# Patient Record
Sex: MALE | Race: WHITE | NOT HISPANIC OR LATINO | URBAN - METROPOLITAN AREA
[De-identification: names, ages, dates, MRNs, and addresses within clinical notes are randomized per-mention and may not be internally consistent; named-entity substitution may affect disease eponyms.]

---

## 2017-01-09 ENCOUNTER — EMERGENCY (EMERGENCY)
Facility: HOSPITAL | Age: 72
LOS: 1 days | Discharge: HOME | End: 2017-01-09
Attending: EMERGENCY MEDICINE | Admitting: EMERGENCY MEDICINE
Payer: MEDICARE

## 2017-01-09 VITALS
OXYGEN SATURATION: 95 % | SYSTOLIC BLOOD PRESSURE: 123 MMHG | WEIGHT: 184.09 LBS | HEIGHT: 73 IN | RESPIRATION RATE: 18 BRPM | TEMPERATURE: 101 F | HEART RATE: 77 BPM | DIASTOLIC BLOOD PRESSURE: 73 MMHG

## 2017-01-09 VITALS
HEART RATE: 71 BPM | TEMPERATURE: 100 F | DIASTOLIC BLOOD PRESSURE: 71 MMHG | OXYGEN SATURATION: 96 % | SYSTOLIC BLOOD PRESSURE: 139 MMHG | RESPIRATION RATE: 16 BRPM

## 2017-01-09 DIAGNOSIS — Z96.652 PRESENCE OF LEFT ARTIFICIAL KNEE JOINT: Chronic | ICD-10-CM

## 2017-01-09 DIAGNOSIS — R50.9 FEVER, UNSPECIFIED: ICD-10-CM

## 2017-01-09 DIAGNOSIS — R05 COUGH: ICD-10-CM

## 2017-01-09 LAB
ALBUMIN SERPL ELPH-MCNC: 3.9 G/DL — SIGNIFICANT CHANGE UP (ref 3.4–5)
ALP SERPL-CCNC: 53 U/L — SIGNIFICANT CHANGE UP (ref 40–120)
ALT FLD-CCNC: 31 U/L — SIGNIFICANT CHANGE UP (ref 12–42)
ANION GAP SERPL CALC-SCNC: 8 MMOL/L — LOW (ref 9–16)
APPEARANCE UR: CLEAR — SIGNIFICANT CHANGE UP
APTT BLD: 33 SEC — SIGNIFICANT CHANGE UP (ref 27.5–37.4)
AST SERPL-CCNC: 12 U/L — LOW (ref 15–37)
BASOPHILS NFR BLD AUTO: 0.2 % — SIGNIFICANT CHANGE UP (ref 0–2)
BILIRUB SERPL-MCNC: 1.3 MG/DL — HIGH (ref 0.2–1.2)
BILIRUB UR-MCNC: NEGATIVE — SIGNIFICANT CHANGE UP
BUN SERPL-MCNC: 19 MG/DL — SIGNIFICANT CHANGE UP (ref 7–23)
CALCIUM SERPL-MCNC: 8.3 MG/DL — LOW (ref 8.5–10.5)
CHLORIDE SERPL-SCNC: 99 MMOL/L — SIGNIFICANT CHANGE UP (ref 96–108)
CO2 SERPL-SCNC: 27 MMOL/L — SIGNIFICANT CHANGE UP (ref 22–31)
COLOR SPEC: YELLOW — SIGNIFICANT CHANGE UP
CREAT SERPL-MCNC: 1.06 MG/DL — SIGNIFICANT CHANGE UP (ref 0.5–1.3)
DIFF PNL FLD: (no result)
EOSINOPHIL NFR BLD AUTO: 2.3 % — SIGNIFICANT CHANGE UP (ref 0–6)
GLUCOSE SERPL-MCNC: 103 MG/DL — HIGH (ref 70–99)
GLUCOSE UR QL: NEGATIVE — SIGNIFICANT CHANGE UP
HCT VFR BLD CALC: 40.7 % — SIGNIFICANT CHANGE UP (ref 39–50)
HGB BLD-MCNC: 14.1 G/DL — SIGNIFICANT CHANGE UP (ref 13–17)
INR BLD: 1.22 — HIGH (ref 0.88–1.16)
KETONES UR-MCNC: NEGATIVE — SIGNIFICANT CHANGE UP
LACTATE SERPL-SCNC: 1.1 MMOL/L — SIGNIFICANT CHANGE UP (ref 0.5–2)
LEUKOCYTE ESTERASE UR-ACNC: NEGATIVE — SIGNIFICANT CHANGE UP
LYMPHOCYTES # BLD AUTO: 13.5 % — SIGNIFICANT CHANGE UP (ref 13–44)
MCHC RBC-ENTMCNC: 31.8 PG — SIGNIFICANT CHANGE UP (ref 27–34)
MCHC RBC-ENTMCNC: 34.6 G/DL — SIGNIFICANT CHANGE UP (ref 32–36)
MCV RBC AUTO: 91.7 FL — SIGNIFICANT CHANGE UP (ref 80–100)
MONOCYTES NFR BLD AUTO: 9.6 % — SIGNIFICANT CHANGE UP (ref 2–14)
NEUTROPHILS NFR BLD AUTO: 74.4 % — SIGNIFICANT CHANGE UP (ref 43–77)
NITRITE UR-MCNC: NEGATIVE — SIGNIFICANT CHANGE UP
PH UR: 6 — SIGNIFICANT CHANGE UP (ref 4–8)
PLATELET # BLD AUTO: 138 K/UL — LOW (ref 150–400)
POTASSIUM SERPL-MCNC: 3.9 MMOL/L — SIGNIFICANT CHANGE UP (ref 3.5–5.3)
POTASSIUM SERPL-SCNC: 3.9 MMOL/L — SIGNIFICANT CHANGE UP (ref 3.5–5.3)
PROT SERPL-MCNC: 6.5 G/DL — SIGNIFICANT CHANGE UP (ref 6.4–8.2)
PROT UR-MCNC: NEGATIVE MG/DL — SIGNIFICANT CHANGE UP
PROTHROM AB SERPL-ACNC: 13.6 SEC — HIGH (ref 10–13.1)
RAPID RVP RESULT: SIGNIFICANT CHANGE UP
RBC # BLD: 4.44 M/UL — SIGNIFICANT CHANGE UP (ref 4.2–5.8)
RBC # FLD: 12.6 % — SIGNIFICANT CHANGE UP (ref 10.3–16.9)
SODIUM SERPL-SCNC: 134 MMOL/L — LOW (ref 135–145)
SP GR SPEC: 1.02 — SIGNIFICANT CHANGE UP (ref 1–1.03)
UROBILINOGEN FLD QL: 0.2 E.U./DL — SIGNIFICANT CHANGE UP
WBC # BLD: 8.2 K/UL — SIGNIFICANT CHANGE UP (ref 3.8–10.5)
WBC # FLD AUTO: 8.2 K/UL — SIGNIFICANT CHANGE UP (ref 3.8–10.5)

## 2017-01-09 PROCEDURE — 85730 THROMBOPLASTIN TIME PARTIAL: CPT

## 2017-01-09 PROCEDURE — 87086 URINE CULTURE/COLONY COUNT: CPT

## 2017-01-09 PROCEDURE — 85610 PROTHROMBIN TIME: CPT

## 2017-01-09 PROCEDURE — 81001 URINALYSIS AUTO W/SCOPE: CPT

## 2017-01-09 PROCEDURE — 87486 CHLMYD PNEUM DNA AMP PROBE: CPT

## 2017-01-09 PROCEDURE — 71046 X-RAY EXAM CHEST 2 VIEWS: CPT

## 2017-01-09 PROCEDURE — 87581 M.PNEUMON DNA AMP PROBE: CPT

## 2017-01-09 PROCEDURE — 36415 COLL VENOUS BLD VENIPUNCTURE: CPT

## 2017-01-09 PROCEDURE — 85025 COMPLETE CBC W/AUTO DIFF WBC: CPT

## 2017-01-09 PROCEDURE — 83605 ASSAY OF LACTIC ACID: CPT

## 2017-01-09 PROCEDURE — 87633 RESP VIRUS 12-25 TARGETS: CPT

## 2017-01-09 PROCEDURE — 74020: CPT | Mod: 26

## 2017-01-09 PROCEDURE — 71020: CPT | Mod: 26

## 2017-01-09 PROCEDURE — 87040 BLOOD CULTURE FOR BACTERIA: CPT

## 2017-01-09 PROCEDURE — 74020: CPT

## 2017-01-09 PROCEDURE — 99284 EMERGENCY DEPT VISIT MOD MDM: CPT | Mod: 25

## 2017-01-09 PROCEDURE — 80053 COMPREHEN METABOLIC PANEL: CPT

## 2017-01-09 PROCEDURE — 81003 URINALYSIS AUTO W/O SCOPE: CPT

## 2017-01-09 PROCEDURE — 87798 DETECT AGENT NOS DNA AMP: CPT

## 2017-01-09 RX ORDER — SENNA PLUS 8.6 MG/1
1 TABLET ORAL
Qty: 30 | Refills: 0 | OUTPATIENT
Start: 2017-01-09

## 2017-01-09 RX ORDER — SODIUM CHLORIDE 9 MG/ML
500 INJECTION INTRAMUSCULAR; INTRAVENOUS; SUBCUTANEOUS ONCE
Qty: 0 | Refills: 0 | Status: COMPLETED | OUTPATIENT
Start: 2017-01-09 | End: 2017-01-09

## 2017-01-09 RX ORDER — TAMSULOSIN HYDROCHLORIDE 0.4 MG/1
1 CAPSULE ORAL
Qty: 30 | Refills: 0
Start: 2017-01-09

## 2017-01-09 RX ORDER — DOCUSATE SODIUM 100 MG
1 CAPSULE ORAL
Qty: 15 | Refills: 0 | OUTPATIENT
Start: 2017-01-09

## 2017-01-09 RX ORDER — ACETAMINOPHEN 500 MG
650 TABLET ORAL ONCE
Qty: 0 | Refills: 0 | Status: COMPLETED | OUTPATIENT
Start: 2017-01-09 | End: 2017-01-09

## 2017-01-09 RX ORDER — SODIUM CHLORIDE 9 MG/ML
1000 INJECTION INTRAMUSCULAR; INTRAVENOUS; SUBCUTANEOUS ONCE
Qty: 0 | Refills: 0 | Status: COMPLETED | OUTPATIENT
Start: 2017-01-09 | End: 2017-01-09

## 2017-01-09 RX ADMIN — SODIUM CHLORIDE 1000 MILLILITER(S): 9 INJECTION INTRAMUSCULAR; INTRAVENOUS; SUBCUTANEOUS at 13:27

## 2017-01-09 RX ADMIN — SODIUM CHLORIDE 1000 MILLILITER(S): 9 INJECTION INTRAMUSCULAR; INTRAVENOUS; SUBCUTANEOUS at 17:30

## 2017-01-09 RX ADMIN — Medication 650 MILLIGRAM(S): at 13:27

## 2017-01-09 NOTE — ED SUB INTERN NOTE - NSALCOHOLUSECOMMENT_GEN_ALL_CORE_FT
1-2 glasses of wine, 5 times per week. Cocktail 4 days per week. Patient states he and his son have noticed he has been drinking more since senior living

## 2017-01-09 NOTE — ED CLERICAL - NS ED CLERK NOTE PRE-ARRIVAL INFORMATION; ADDITIONAL PRE-ARRIVAL INFORMATION
71 y.ojosé antonio- Miguel Daily-Hi Fever, R/O Pneumonia & Urinary Obstruction. Pls call Dr Leventhal [phone]

## 2017-01-09 NOTE — ED PROVIDER NOTE - MEDICAL DECISION MAKING DETAILS
Improved with hydration Pt with intermittent cough and fever for last 4-5 weeks. No cp, sob. Also with ongoing constipation and urinary hesitancy with hx of BPH.  Ddx: pna, URI, bronchitis, other. Labs, XR ordered, no focal infiltrate, no leukocytosis. Hydrated in ED, given tylenol with improving temp,  pt stating feeling symptomatically better. XR abd with evidence of stool throuhought colon, BSUS showing some urinary retention. Discussed care and lab findings with pcp Dr. Chen throughout ED course, will have pt continue on augmentin, provide flomax and trial of colace/senna, will follow up with pcp within 48 hours.

## 2017-01-09 NOTE — ED PROVIDER NOTE - OBJECTIVE STATEMENT
70 yo with cough, fever for one month 4-5 weeks non productive. No travel hx. Increased urgency with no dysuria.    L knee replacement hx 70 yo with intermittent cough and fever for approx 4-5 weeks non productive in nature. No travel hx. No assoc chest pain, abdominal pain, nausea, vomiting. Has been having ongoing consitpation with last bowel movement on Thursday. Also with increased urinary hesitancy, has not been able to get stream out as well as before. Has been diagnosed with BPH in the past and placed on medication but has not had taken meds in at least one year. No dysuria, gross hematuria. No other aggravating or relieving factors.

## 2017-01-09 NOTE — ED ADULT TRIAGE NOTE - CHIEF COMPLAINT QUOTE
c/o cough and fever (102.4F) that got worse within the last week. Pt also c/o urinary urgency, however with small amount output.

## 2017-01-09 NOTE — ED SUB INTERN NOTE - MEDICAL DECISION MAKING DETAILS
72 yo man p/w cough and fever x 4-5 weeks worsening in past 1 week, on Augmentin x 4 days with no improvement and decreased urine output and constipation.

## 2017-01-09 NOTE — ED PROVIDER NOTE - LAB INTERPRETATION
ER Physician: Marcella Chance MD  CHEST XRAY INTERPRETATION: lungs clear, heart shadow normal, bony structures intact

## 2017-01-09 NOTE — ED SUB INTERN NOTE - FAMILY HISTORY
Father  Still living? No  Family history of myocardial infarction, Age at diagnosis: Age Unknown  Family history of type 1 diabetes mellitus, Age at diagnosis: Age Unknown     Mother  Still living? No  Family history of myocardial infarction, Age at diagnosis: Age Unknown

## 2017-01-11 LAB
CULTURE RESULTS: NO GROWTH — SIGNIFICANT CHANGE UP
SPECIMEN SOURCE: SIGNIFICANT CHANGE UP

## 2017-01-14 LAB
CULTURE RESULTS: SIGNIFICANT CHANGE UP
CULTURE RESULTS: SIGNIFICANT CHANGE UP
SPECIMEN SOURCE: SIGNIFICANT CHANGE UP
SPECIMEN SOURCE: SIGNIFICANT CHANGE UP

## 2017-09-14 NOTE — ED SUB INTERN NOTE - CROS ED CARDIOVAS ALL NEG
1. Have you been to the ER, urgent care clinic since your last visit? Hospitalized since your last visit? Yes A week ago [facility] Abdominal pain and vomiting     2. Have you seen or consulted any other health care providers outside of the [address] [place] [address] since your last visit? Include any pap smears or colon screening. NO     3. Since your last visit, have you had any of the following symptoms? No    4. Have you had any blood work, X-rays or cardiac testing? Yes in hospital     5. Where do you normally have your labs drawn? [facility]    6. Do you need any refills today?    no - - -

## 2020-03-20 ENCOUNTER — EMERGENCY (EMERGENCY)
Facility: HOSPITAL | Age: 75
LOS: 1 days | Discharge: HOME | End: 2020-03-20
Attending: EMERGENCY MEDICINE | Admitting: EMERGENCY MEDICINE
Payer: MEDICARE

## 2020-03-20 VITALS
OXYGEN SATURATION: 95 % | SYSTOLIC BLOOD PRESSURE: 185 MMHG | DIASTOLIC BLOOD PRESSURE: 79 MMHG | TEMPERATURE: 98 F | HEART RATE: 76 BPM | RESPIRATION RATE: 18 BRPM | WEIGHT: 190.04 LBS | HEIGHT: 73 IN

## 2020-03-20 VITALS
OXYGEN SATURATION: 96 % | RESPIRATION RATE: 18 BRPM | HEART RATE: 62 BPM | TEMPERATURE: 98 F | SYSTOLIC BLOOD PRESSURE: 126 MMHG | DIASTOLIC BLOOD PRESSURE: 71 MMHG

## 2020-03-20 DIAGNOSIS — Z03.818 ENCOUNTER FOR OBSERVATION FOR SUSPECTED EXPOSURE TO OTHER BIOLOGICAL AGENTS RULED OUT: ICD-10-CM

## 2020-03-20 DIAGNOSIS — R51 HEADACHE: ICD-10-CM

## 2020-03-20 DIAGNOSIS — R06.02 SHORTNESS OF BREATH: ICD-10-CM

## 2020-03-20 DIAGNOSIS — R42 DIZZINESS AND GIDDINESS: ICD-10-CM

## 2020-03-20 DIAGNOSIS — B34.9 VIRAL INFECTION, UNSPECIFIED: ICD-10-CM

## 2020-03-20 DIAGNOSIS — Z96.652 PRESENCE OF LEFT ARTIFICIAL KNEE JOINT: Chronic | ICD-10-CM

## 2020-03-20 LAB — RAPID RVP RESULT: SIGNIFICANT CHANGE UP

## 2020-03-20 PROCEDURE — 87581 M.PNEUMON DNA AMP PROBE: CPT

## 2020-03-20 PROCEDURE — 87635 SARS-COV-2 COVID-19 AMP PRB: CPT

## 2020-03-20 PROCEDURE — 99283 EMERGENCY DEPT VISIT LOW MDM: CPT

## 2020-03-20 PROCEDURE — 87486 CHLMYD PNEUM DNA AMP PROBE: CPT

## 2020-03-20 PROCEDURE — 87798 DETECT AGENT NOS DNA AMP: CPT

## 2020-03-20 PROCEDURE — 87633 RESP VIRUS 12-25 TARGETS: CPT

## 2020-03-20 PROCEDURE — 71045 X-RAY EXAM CHEST 1 VIEW: CPT

## 2020-03-20 PROCEDURE — 99283 EMERGENCY DEPT VISIT LOW MDM: CPT | Mod: 25

## 2020-03-20 PROCEDURE — 71045 X-RAY EXAM CHEST 1 VIEW: CPT | Mod: 26

## 2020-03-20 RX ORDER — IBUPROFEN 200 MG
600 TABLET ORAL ONCE
Refills: 0 | Status: COMPLETED | OUTPATIENT
Start: 2020-03-20 | End: 2020-03-20

## 2020-03-20 RX ADMIN — Medication 600 MILLIGRAM(S): at 19:54

## 2020-03-20 NOTE — ED PROVIDER NOTE - NSFOLLOWUPINSTRUCTIONS_ED_ALL_ED_FT
Please see discharge instructions on COVID19 self quarantine pending results of your testing.  There is a follow up program that will contact your about results of your test negative or positive but it may take several days for results to be available due to a backlog of tests and limited availability.  Please return to the ER if symptoms worsen or other concerns.    Viral Syndrome    WHAT YOU NEED TO KNOW:    Viral syndrome is a term used for symptoms of an infection caused by a virus. Viruses are spread easily from person to person through the air and on shared items. An illness caused by a virus usually goes away in 10 to 14 days without treatment. Antibiotics are not given for a viral infection.     DISCHARGE INSTRUCTIONS:    Call 911 for the following:     You have a seizure.       You cannot be woken.       You have chest pain or trouble breathing.     Return to the emergency department if:     You have a stiff neck, a bad headache, and sensitivity to light.       You feel weak, dizzy, or confused.       You stop urinating or urinate a lot less than normal.       You cough up blood or thick, yellow or green, mucus.       You have severe abdominal pain or your abdomen is larger than usual.     Contact your healthcare provider if:     Your symptoms do not get better with treatment, or get worse, after 3 days.       You have a rash or ear pain.       You have burning when you urinate.       You have questions or concerns about your condition or care.    Medicines: You may need any of the following:     Acetaminophen decreases pain and fever. It is available without a doctor's order. Ask how much medicine to take and how often to take it. Follow directions. Acetaminophen can cause liver damage if not taken correctly.       NSAIDs, such as ibuprofen, help decrease swelling, pain, and fever. NSAIDs can cause stomach bleeding or kidney problems in certain people. If you take blood thinner medicine, always ask your healthcare provider if NSAIDs are safe for you. Always read the medicine label and follow directions.      Cold medicine helps decrease swelling, control a cough, and relieve chest or nasal congestion.       Saline nasal spray helps decrease nasal congestion.       Take your medicine as directed. Contact your healthcare provider if you think your medicine is not helping or if you have side effects. Tell him of her if you are allergic to any medicine. Keep a list of the medicines, vitamins, and herbs you take. Include the amounts, and when and why you take them. Bring the list or the pill bottles to follow-up visits. Carry your medicine list with you in case of an emergency.    Manage your symptoms:     Drink liquids as directed to prevent dehydration. Ask how much liquid to drink each day and which liquids are best for you. Ask if you should drink an oral rehydration solution (ORS). An ORS has the right amounts of water, salts, and sugar you need to replace body fluids. This may help prevent dehydration caused by vomiting or diarrhea. Do not drink liquids with caffeine. Drinks with caffeine can make dehydration worse.       Get plenty of rest to help your body heal. Take naps throughout the day. Ask your healthcare provider when you can return to work and your normal activities.       Use a cool mist humidifier to help you breathe easier if you have nasal or chest congestion. Ask your healthcare provider how to use a cool mist humidifier.       Eat honey or use cough drops to help decrease throat discomfort. Ask your healthcare provider how much honey you should eat each day. Cough drops are available without a doctor's order. Follow directions for taking cough drops.       Do not smoke and stay away from others who smoke. Nicotine and other chemicals in cigarettes and cigars can cause lung damage. Smoking can also delay healing. Ask your healthcare provider for information if you currently smoke and need help to quit. E-cigarettes or smokeless tobacco still contain nicotine. Talk to your healthcare provider before you use these products.       Wash your hands frequently to prevent the spread of germs to others. Use soap and water. Use gel hand  when soap and water are not available. Wash your hands after you use the bathroom, cough, or sneeze. Wash your hands before you prepare or eat food.     Follow up with your healthcare provider as directed: Write down your questions so you remember to ask them during your visits.

## 2020-03-20 NOTE — ED PROVIDER NOTE - PATIENT PORTAL LINK FT
You can access the FollowMyHealth Patient Portal offered by [facility] by registering at the following website: [email or web address]. By joining [facility]’s FollowMyHealth portal, you will also be able to view your health information using other applications (apps) compatible with our system.

## 2020-03-20 NOTE — ED ADULT TRIAGE NOTE - CHIEF COMPLAINT QUOTE
Patient states sent by PMD Dr. Leventhal for COVID test, c/o denies any fever, c/o of dry cough,  dizziness, unsteady gait, waking up middle of night w/ SOB, tightness in chest, feeling exhaustion, symptoms 5 days ago.  Unknown if exposed to COVID, VSS in ED triage.

## 2020-03-20 NOTE — ED ADULT NURSE NOTE - CHPI ED NUR SYMPTOMS NEG
no weakness/no tingling/no dizziness/no decreased eating/drinking/no nausea/no vomiting/no fever/no chills

## 2020-03-20 NOTE — ED ADULT NURSE NOTE - OBJECTIVE STATEMENT
Patient states sent by PMD Dr. Leventhal for COVID test, c/o denies any fever, c/o of dry cough,  dizziness, unsteady gait, waking up middle of night w/ SOB, tightness in chest, feeling exhaustion, symptoms 5 days ago.  Unknown if exposed to COVID.  Pt currently ambulating with even gait. positive PMS x4 extremities.  Pt denies SOB, chest pain, abd pain, /GI symptoms at this time. Pt is alert and oriented x3.

## 2020-03-21 PROBLEM — I63.9 CEREBRAL INFARCTION, UNSPECIFIED: Chronic | Status: ACTIVE | Noted: 2017-01-09

## 2020-03-22 LAB — SARS-COV-2 RNA SPEC QL NAA+PROBE: SIGNIFICANT CHANGE UP

## 2021-07-08 ENCOUNTER — INPATIENT (INPATIENT)
Facility: HOSPITAL | Age: 76
LOS: 0 days | Discharge: HOME | DRG: 694 | End: 2021-07-09
Attending: UROLOGY | Admitting: UROLOGY
Payer: COMMERCIAL

## 2021-07-08 VITALS
OXYGEN SATURATION: 96 % | RESPIRATION RATE: 18 BRPM | HEART RATE: 57 BPM | TEMPERATURE: 99 F | WEIGHT: 188.05 LBS | SYSTOLIC BLOOD PRESSURE: 203 MMHG | DIASTOLIC BLOOD PRESSURE: 85 MMHG | HEIGHT: 73 IN

## 2021-07-08 DIAGNOSIS — Z96.652 PRESENCE OF LEFT ARTIFICIAL KNEE JOINT: Chronic | ICD-10-CM

## 2021-07-08 DIAGNOSIS — N23 UNSPECIFIED RENAL COLIC: ICD-10-CM

## 2021-07-08 LAB
ALBUMIN SERPL ELPH-MCNC: 4.2 G/DL — SIGNIFICANT CHANGE UP (ref 3.3–5)
ALP SERPL-CCNC: 73 U/L — SIGNIFICANT CHANGE UP (ref 40–120)
ALT FLD-CCNC: 16 U/L — SIGNIFICANT CHANGE UP (ref 10–45)
ANION GAP SERPL CALC-SCNC: 10 MMOL/L — SIGNIFICANT CHANGE UP (ref 5–17)
APPEARANCE UR: CLEAR — SIGNIFICANT CHANGE UP
AST SERPL-CCNC: 18 U/L — SIGNIFICANT CHANGE UP (ref 10–40)
BACTERIA # UR AUTO: PRESENT /HPF
BILIRUB SERPL-MCNC: 1 MG/DL — SIGNIFICANT CHANGE UP (ref 0.2–1.2)
BILIRUB UR-MCNC: NEGATIVE — SIGNIFICANT CHANGE UP
BUN SERPL-MCNC: 24 MG/DL — HIGH (ref 7–23)
CALCIUM SERPL-MCNC: 9.3 MG/DL — SIGNIFICANT CHANGE UP (ref 8.4–10.5)
CHLORIDE SERPL-SCNC: 103 MMOL/L — SIGNIFICANT CHANGE UP (ref 96–108)
CO2 SERPL-SCNC: 25 MMOL/L — SIGNIFICANT CHANGE UP (ref 22–31)
COLOR SPEC: YELLOW — SIGNIFICANT CHANGE UP
CREAT SERPL-MCNC: 1.42 MG/DL — HIGH (ref 0.5–1.3)
DIFF PNL FLD: ABNORMAL
EPI CELLS # UR: SIGNIFICANT CHANGE UP /HPF (ref 0–5)
GLUCOSE SERPL-MCNC: 112 MG/DL — HIGH (ref 70–99)
GLUCOSE UR QL: NEGATIVE — SIGNIFICANT CHANGE UP
HCT VFR BLD CALC: 41.5 % — SIGNIFICANT CHANGE UP (ref 39–50)
HGB BLD-MCNC: 14 G/DL — SIGNIFICANT CHANGE UP (ref 13–17)
KETONES UR-MCNC: 15 MG/DL
LEUKOCYTE ESTERASE UR-ACNC: NEGATIVE — SIGNIFICANT CHANGE UP
MCHC RBC-ENTMCNC: 31.7 PG — SIGNIFICANT CHANGE UP (ref 27–34)
MCHC RBC-ENTMCNC: 33.7 GM/DL — SIGNIFICANT CHANGE UP (ref 32–36)
MCV RBC AUTO: 94.1 FL — SIGNIFICANT CHANGE UP (ref 80–100)
NITRITE UR-MCNC: NEGATIVE — SIGNIFICANT CHANGE UP
NRBC # BLD: 0 /100 WBCS — SIGNIFICANT CHANGE UP (ref 0–0)
PH UR: 6 — SIGNIFICANT CHANGE UP (ref 5–8)
PLATELET # BLD AUTO: 150 K/UL — SIGNIFICANT CHANGE UP (ref 150–400)
POTASSIUM SERPL-MCNC: 4.1 MMOL/L — SIGNIFICANT CHANGE UP (ref 3.5–5.3)
POTASSIUM SERPL-SCNC: 4.1 MMOL/L — SIGNIFICANT CHANGE UP (ref 3.5–5.3)
PROT SERPL-MCNC: 6.3 G/DL — SIGNIFICANT CHANGE UP (ref 6–8.3)
PROT UR-MCNC: NEGATIVE MG/DL — SIGNIFICANT CHANGE UP
RBC # BLD: 4.41 M/UL — SIGNIFICANT CHANGE UP (ref 4.2–5.8)
RBC # FLD: 13.3 % — SIGNIFICANT CHANGE UP (ref 10.3–14.5)
RBC CASTS # UR COMP ASSIST: ABNORMAL /HPF
SODIUM SERPL-SCNC: 138 MMOL/L — SIGNIFICANT CHANGE UP (ref 135–145)
SP GR SPEC: 1.02 — SIGNIFICANT CHANGE UP (ref 1–1.03)
UROBILINOGEN FLD QL: 1 E.U./DL — SIGNIFICANT CHANGE UP
WBC # BLD: 10.39 K/UL — SIGNIFICANT CHANGE UP (ref 3.8–10.5)
WBC # FLD AUTO: 10.39 K/UL — SIGNIFICANT CHANGE UP (ref 3.8–10.5)
WBC UR QL: < 5 /HPF — SIGNIFICANT CHANGE UP

## 2021-07-08 PROCEDURE — 99285 EMERGENCY DEPT VISIT HI MDM: CPT

## 2021-07-08 RX ORDER — KETOROLAC TROMETHAMINE 30 MG/ML
30 SYRINGE (ML) INJECTION ONCE
Refills: 0 | Status: DISCONTINUED | OUTPATIENT
Start: 2021-07-08 | End: 2021-07-08

## 2021-07-08 RX ORDER — TAMSULOSIN HYDROCHLORIDE 0.4 MG/1
0.4 CAPSULE ORAL AT BEDTIME
Refills: 0 | Status: DISCONTINUED | OUTPATIENT
Start: 2021-07-08 | End: 2021-07-09

## 2021-07-08 RX ORDER — SODIUM CHLORIDE 9 MG/ML
1000 INJECTION INTRAMUSCULAR; INTRAVENOUS; SUBCUTANEOUS ONCE
Refills: 0 | Status: COMPLETED | OUTPATIENT
Start: 2021-07-08 | End: 2021-07-08

## 2021-07-08 RX ORDER — ACETAMINOPHEN 500 MG
650 TABLET ORAL EVERY 6 HOURS
Refills: 0 | Status: DISCONTINUED | OUTPATIENT
Start: 2021-07-08 | End: 2021-07-09

## 2021-07-08 RX ORDER — POLYETHYLENE GLYCOL 3350 17 G/17G
17 POWDER, FOR SOLUTION ORAL ONCE
Refills: 0 | Status: DISCONTINUED | OUTPATIENT
Start: 2021-07-08 | End: 2021-07-09

## 2021-07-08 RX ORDER — ONDANSETRON 8 MG/1
4 TABLET, FILM COATED ORAL EVERY 8 HOURS
Refills: 0 | Status: DISCONTINUED | OUTPATIENT
Start: 2021-07-08 | End: 2021-07-09

## 2021-07-08 RX ORDER — SODIUM CHLORIDE 9 MG/ML
1000 INJECTION INTRAMUSCULAR; INTRAVENOUS; SUBCUTANEOUS
Refills: 0 | Status: DISCONTINUED | OUTPATIENT
Start: 2021-07-08 | End: 2021-07-09

## 2021-07-08 RX ORDER — SODIUM CHLORIDE 9 MG/ML
1000 INJECTION, SOLUTION INTRAVENOUS ONCE
Refills: 0 | Status: COMPLETED | OUTPATIENT
Start: 2021-07-08 | End: 2021-07-08

## 2021-07-08 RX ORDER — MORPHINE SULFATE 50 MG/1
4 CAPSULE, EXTENDED RELEASE ORAL ONCE
Refills: 0 | Status: DISCONTINUED | OUTPATIENT
Start: 2021-07-08 | End: 2021-07-08

## 2021-07-08 RX ADMIN — Medication 30 MILLIGRAM(S): at 17:56

## 2021-07-08 RX ADMIN — SODIUM CHLORIDE 1000 MILLILITER(S): 9 INJECTION, SOLUTION INTRAVENOUS at 17:56

## 2021-07-08 RX ADMIN — Medication 30 MILLIGRAM(S): at 18:27

## 2021-07-08 RX ADMIN — MORPHINE SULFATE 4 MILLIGRAM(S): 50 CAPSULE, EXTENDED RELEASE ORAL at 21:54

## 2021-07-08 RX ADMIN — SODIUM CHLORIDE 1000 MILLILITER(S): 9 INJECTION INTRAMUSCULAR; INTRAVENOUS; SUBCUTANEOUS at 21:55

## 2021-07-08 RX ADMIN — MORPHINE SULFATE 4 MILLIGRAM(S): 50 CAPSULE, EXTENDED RELEASE ORAL at 22:43

## 2021-07-08 NOTE — H&P ADULT - ASSESSMENT
75yo male with left flank pain, emesis, poor PO intake and elevated PVR. CT scan showing  "In the distal ureter approximately 4cm proximal to the UVJ, there is a calculus measuring 0.4x0.5x0.4cm (802 Hounsfield units)  Distally there is persistent moderated hydroureter, with a second calculus distally within the left ureterovesical junction, partially protruding into the bladder lumen, 0.8x0.5x0.5cm.   There is relatively severe stranding of fat periureteral fat and fat about the renal pelvis and perinephric fat which may be the result of obstruction and/or infection"

## 2021-07-08 NOTE — ED ADULT NURSE NOTE - OBJECTIVE STATEMENT
Patient w/ Hx of diverticulitis and kidney stones, c/o of LLQ abdominal pain, radiates to left flank. Patient w/ Hx of diverticulitis and kidney stones, c/o of LLQ abdominal pain, radiates to left flank.  Patient had CT scan today at [facility], 2 distal left ureteral calculi noted, diverticulosis noted, sent to ED by PMD.

## 2021-07-08 NOTE — H&P ADULT - NSICDXFAMILYHX_GEN_ALL_CORE_FT
FAMILY HISTORY:  Father  Still living? No  Family history of myocardial infarction, Age at diagnosis: Age Unknown  Family history of type 1 diabetes mellitus, Age at diagnosis: Age Unknown    Mother  Still living? No  Family history of myocardial infarction, Age at diagnosis: Age Unknown

## 2021-07-08 NOTE — ED ADULT NURSE REASSESSMENT NOTE - NS ED NURSE REASSESS COMMENT FT1
Patient /aoX#3, c/o of LLQ abominal throbbing pain, radiates to left flank, no nausea/vomitting, no urinary difficulties/dysuria.   Vital signs stable.  Left AC PIV  #20 in place, all labs sent,no complications.  toradol 30mg IVP, LR adminsitered.  Stble and comfortable.  Results and disposition pending.

## 2021-07-08 NOTE — ED ADULT TRIAGE NOTE - CHIEF COMPLAINT QUOTE
PT presents to ED w/ c/o LL abdominal pain. Pt has out pt CT done confirmed diverticulitis and Kidney stones. PT denies N/V/fever/chills.

## 2021-07-08 NOTE — ED ADULT NURSE REASSESSMENT NOTE - NS ED NURSE REASSESS COMMENT FT1
Patient a/oX3, states LLQ abdominal and flank pain improved /sp Morphine 4mg IVP.  Vital signs stable.  Received report from Urology MD Jessy Bahena #18 Coude reed catheter inserted , noted good urine output.  NSS bolus completed.  For Urology admit.  ENdorsment report received by ED Holding CAROLA Cortes.

## 2021-07-08 NOTE — ED PROVIDER NOTE - OBJECTIVE STATEMENT
77 y/o M Presents with LLQ and left flank pain x 2 weeks. Reports associated chills over the last 2-3 days but denies fever. Pt was seen in urgent care today where he was sent for an outpatient CT. Results showed that he has kidney stones with hydronephrosis and diverticulitis, however no diverticulitis. Pt reports that the pain is intermittent, 10/10, and stabbing in nature.

## 2021-07-08 NOTE — H&P ADULT - NSHPPHYSICALEXAM_GEN_ALL_CORE
general: alert and oriented. no acute distress   abdomen: distended. non-tender to palpation. tender to palpation in left inguinal region   : +left CVAT.18fr reed placed. yellow urine return approx 700cc

## 2021-07-08 NOTE — ED PROVIDER NOTE - CARE PLAN
Principal Discharge DX:	Renal colic on left side  Secondary Diagnosis:	Hydronephrosis with urinary obstruction due to ureteral calculus

## 2021-07-08 NOTE — H&P ADULT - NSHPLABSRESULTS_GEN_ALL_CORE
14.0   10.39 )-----------( 150      ( 2021 17:55 )             41.5   -    138  |  103  |  24<H>  ----------------------------<  112<H>  4.1   |  25  |  1.42<H>    Ca    9.3      2021 17:55    TPro  6.3  /  Alb  4.2  /  TBili  1.0  /  DBili  x   /  AST  18  /  ALT  16  /  AlkPhos  73  07-08    Urinalysis Basic - ( 2021 17:55 )    Color: Yellow / Appearance: Clear / S.025 / pH: x  Gluc: x / Ketone: 15 mg/dL  / Bili: Negative / Urobili: 1.0 E.U./dL   Blood: x / Protein: NEGATIVE mg/dL / Nitrite: NEGATIVE   Leuk Esterase: NEGATIVE / RBC: 5-10 /HPF / WBC < 5 /HPF   Sq Epi: x / Non Sq Epi: 0-5 /HPF / Bacteria: Present /HPF

## 2021-07-08 NOTE — H&P ADULT - PROBLEM SELECTOR PLAN 1
- admit to urology   - IVF @120cc/hours   - pain control   - nausea control   - flomax   - monitor urine output   - strain urine   - miralax for constipation   - discussed with  team

## 2021-07-08 NOTE — ED PROVIDER NOTE - PHYSICAL EXAMINATION
VITAL SIGNS: I have reviewed nursing notes and confirm.  CONSTITUTIONAL: Well-developed; well-nourished; in no acute distress.   SKIN:  warm and dry, no acute rash.   HEAD:  normocephalic, atraumatic.  EYES: EOM intact; PERRL. conjunctiva and sclera clear.  ENT: No nasal discharge; airway clear. Moist mucus membranes  NECK: Supple; non tender. No JVD.   CARD: S1, S2 normal; no murmurs, gallops, or rubs. Regular rate and rhythm. No chest wall tenderness.    RESP:  Clear to auscultation b/l, no wheezes, rales or rhonchi.  ABD: Normal bowel sounds; soft; non-distended; no guarding/ rebound/rigidity. + LLQ abdominal tenderness. + Left sided CVA tenderness.   EXT: Normal ROM. No clubbing, cyanosis or edema. 2+ pulses to b/l ue/le.  NEURO: Alert, oriented, no focal neuro deficits   PSYCH: Cooperative, mood and affect appropriate.

## 2021-07-08 NOTE — ED PROVIDER NOTE - PROGRESS NOTE DETAILS
Spoke to Urology PA who will evaluate patient. Urology resident evaluated patient and recommends admission to their service.   Patient post-void residual urine 500mL.

## 2021-07-08 NOTE — ED PROVIDER NOTE - CLINICAL SUMMARY MEDICAL DECISION MAKING FREE TEXT BOX
75 y/o M with LLQ and left flank pain. Will review outpatient CT imaging, IV fluid hydration, lab work, and check UA to assess for UTI . Willl consult urology. 75 y/o M with LLQ and left flank pain secondary to uretrolithiasis with hydronephrosis. Plan for IV fluid hydration, lab work to assess creatinine, assess for UTI with UA. Plan for urology consultation.

## 2021-07-09 ENCOUNTER — TRANSCRIPTION ENCOUNTER (OUTPATIENT)
Age: 76
End: 2021-07-09

## 2021-07-09 ENCOUNTER — APPOINTMENT (OUTPATIENT)
Dept: UROLOGY | Facility: CLINIC | Age: 76
End: 2021-07-09
Payer: MEDICARE

## 2021-07-09 VITALS
BODY MASS INDEX: 24.92 KG/M2 | HEIGHT: 73 IN | WEIGHT: 188 LBS | TEMPERATURE: 97.2 F | DIASTOLIC BLOOD PRESSURE: 71 MMHG | HEART RATE: 54 BPM | SYSTOLIC BLOOD PRESSURE: 126 MMHG

## 2021-07-09 VITALS
RESPIRATION RATE: 16 BRPM | HEART RATE: 55 BPM | TEMPERATURE: 98 F | DIASTOLIC BLOOD PRESSURE: 61 MMHG | OXYGEN SATURATION: 96 % | SYSTOLIC BLOOD PRESSURE: 119 MMHG

## 2021-07-09 DIAGNOSIS — R33.9 RETENTION OF URINE, UNSPECIFIED: ICD-10-CM

## 2021-07-09 DIAGNOSIS — J32.9 CHRONIC SINUSITIS, UNSPECIFIED: ICD-10-CM

## 2021-07-09 DIAGNOSIS — N20.1 CALCULUS OF URETER: ICD-10-CM

## 2021-07-09 DIAGNOSIS — Z82.49 FAMILY HISTORY OF ISCHEMIC HEART DISEASE AND OTHER DISEASES OF THE CIRCULATORY SYSTEM: ICD-10-CM

## 2021-07-09 DIAGNOSIS — Q55.9 CONGENITAL MALFORMATION OF MALE GENITAL ORGAN, UNSPECIFIED: ICD-10-CM

## 2021-07-09 DIAGNOSIS — Z83.3 FAMILY HISTORY OF DIABETES MELLITUS: ICD-10-CM

## 2021-07-09 DIAGNOSIS — I86.1 SCROTAL VARICES: ICD-10-CM

## 2021-07-09 DIAGNOSIS — R39.9 UNSPECIFIED SYMPTOMS AND SIGNS INVOLVING THE GENITOURINARY SYSTEM: ICD-10-CM

## 2021-07-09 DIAGNOSIS — N23 UNSPECIFIED RENAL COLIC: ICD-10-CM

## 2021-07-09 DIAGNOSIS — Z87.891 PERSONAL HISTORY OF NICOTINE DEPENDENCE: ICD-10-CM

## 2021-07-09 DIAGNOSIS — M19.90 UNSPECIFIED OSTEOARTHRITIS, UNSPECIFIED SITE: ICD-10-CM

## 2021-07-09 DIAGNOSIS — Z78.9 OTHER SPECIFIED HEALTH STATUS: ICD-10-CM

## 2021-07-09 DIAGNOSIS — Z86.73 PERSONAL HISTORY OF TRANSIENT ISCHEMIC ATTACK (TIA), AND CEREBRAL INFARCTION W/OUT RESIDUAL DEFICITS: ICD-10-CM

## 2021-07-09 PROBLEM — Z00.00 ENCOUNTER FOR PREVENTIVE HEALTH EXAMINATION: Status: ACTIVE | Noted: 2021-07-09

## 2021-07-09 LAB
ANION GAP SERPL CALC-SCNC: 9 MMOL/L — SIGNIFICANT CHANGE UP (ref 5–17)
BLD GP AB SCN SERPL QL: NEGATIVE — SIGNIFICANT CHANGE UP
BUN SERPL-MCNC: 19 MG/DL — SIGNIFICANT CHANGE UP (ref 7–23)
CALCIUM SERPL-MCNC: 8.3 MG/DL — LOW (ref 8.4–10.5)
CHLORIDE SERPL-SCNC: 107 MMOL/L — SIGNIFICANT CHANGE UP (ref 96–108)
CO2 SERPL-SCNC: 25 MMOL/L — SIGNIFICANT CHANGE UP (ref 22–31)
COVID-19 SPIKE DOMAIN AB INTERP: POSITIVE
COVID-19 SPIKE DOMAIN ANTIBODY RESULT: 125 U/ML — HIGH
CREAT SERPL-MCNC: 1.02 MG/DL — SIGNIFICANT CHANGE UP (ref 0.5–1.3)
GLUCOSE SERPL-MCNC: 93 MG/DL — SIGNIFICANT CHANGE UP (ref 70–99)
HCT VFR BLD CALC: 37.4 % — LOW (ref 39–50)
HCV AB S/CO SERPL IA: 0.04 S/CO — SIGNIFICANT CHANGE UP
HCV AB SERPL-IMP: SIGNIFICANT CHANGE UP
HGB BLD-MCNC: 12.4 G/DL — LOW (ref 13–17)
MCHC RBC-ENTMCNC: 31.7 PG — SIGNIFICANT CHANGE UP (ref 27–34)
MCHC RBC-ENTMCNC: 33.2 GM/DL — SIGNIFICANT CHANGE UP (ref 32–36)
MCV RBC AUTO: 95.7 FL — SIGNIFICANT CHANGE UP (ref 80–100)
NRBC # BLD: 0 /100 WBCS — SIGNIFICANT CHANGE UP (ref 0–0)
PLATELET # BLD AUTO: 139 K/UL — LOW (ref 150–400)
POTASSIUM SERPL-MCNC: 4.4 MMOL/L — SIGNIFICANT CHANGE UP (ref 3.5–5.3)
POTASSIUM SERPL-SCNC: 4.4 MMOL/L — SIGNIFICANT CHANGE UP (ref 3.5–5.3)
RBC # BLD: 3.91 M/UL — LOW (ref 4.2–5.8)
RBC # FLD: 13.5 % — SIGNIFICANT CHANGE UP (ref 10.3–14.5)
RH IG SCN BLD-IMP: POSITIVE — SIGNIFICANT CHANGE UP
SARS-COV-2 IGG+IGM SERPL QL IA: 125 U/ML — HIGH
SARS-COV-2 IGG+IGM SERPL QL IA: POSITIVE
SARS-COV-2 RNA SPEC QL NAA+PROBE: NEGATIVE — SIGNIFICANT CHANGE UP
SODIUM SERPL-SCNC: 141 MMOL/L — SIGNIFICANT CHANGE UP (ref 135–145)
WBC # BLD: 7.11 K/UL — SIGNIFICANT CHANGE UP (ref 3.8–10.5)
WBC # FLD AUTO: 7.11 K/UL — SIGNIFICANT CHANGE UP (ref 3.8–10.5)

## 2021-07-09 PROCEDURE — 86769 SARS-COV-2 COVID-19 ANTIBODY: CPT

## 2021-07-09 PROCEDURE — 87635 SARS-COV-2 COVID-19 AMP PRB: CPT

## 2021-07-09 PROCEDURE — 81001 URINALYSIS AUTO W/SCOPE: CPT

## 2021-07-09 PROCEDURE — 86900 BLOOD TYPING SEROLOGIC ABO: CPT

## 2021-07-09 PROCEDURE — 99213 OFFICE O/P EST LOW 20 MIN: CPT

## 2021-07-09 PROCEDURE — 86901 BLOOD TYPING SEROLOGIC RH(D): CPT

## 2021-07-09 PROCEDURE — 99285 EMERGENCY DEPT VISIT HI MDM: CPT

## 2021-07-09 PROCEDURE — 85027 COMPLETE CBC AUTOMATED: CPT

## 2021-07-09 PROCEDURE — 80053 COMPREHEN METABOLIC PANEL: CPT

## 2021-07-09 PROCEDURE — 87086 URINE CULTURE/COLONY COUNT: CPT

## 2021-07-09 PROCEDURE — 86803 HEPATITIS C AB TEST: CPT

## 2021-07-09 PROCEDURE — 36415 COLL VENOUS BLD VENIPUNCTURE: CPT

## 2021-07-09 PROCEDURE — 86850 RBC ANTIBODY SCREEN: CPT

## 2021-07-09 PROCEDURE — 80048 BASIC METABOLIC PNL TOTAL CA: CPT

## 2021-07-09 PROCEDURE — 96374 THER/PROPH/DIAG INJ IV PUSH: CPT

## 2021-07-09 RX ORDER — CELECOXIB 50 MG/1
CAPSULE ORAL
Refills: 0 | Status: ACTIVE | COMMUNITY

## 2021-07-09 RX ORDER — TAMSULOSIN HYDROCHLORIDE 0.4 MG/1
1 CAPSULE ORAL
Qty: 30 | Refills: 0
Start: 2021-07-09

## 2021-07-09 RX ORDER — AZTREONAM 2 G
1 VIAL (EA) INJECTION
Qty: 10 | Refills: 0
Start: 2021-07-09 | End: 2021-07-13

## 2021-07-09 RX ORDER — ASPIRIN 325 MG/1
TABLET, FILM COATED ORAL
Refills: 0 | Status: ACTIVE | COMMUNITY

## 2021-07-09 RX ORDER — TAMSULOSIN HCL 0.4 MG
CAPSULE ORAL
Refills: 0 | Status: ACTIVE | COMMUNITY

## 2021-07-09 RX ORDER — SODIUM CHLORIDE 9 MG/ML
1000 INJECTION INTRAMUSCULAR; INTRAVENOUS; SUBCUTANEOUS
Refills: 0 | Status: DISCONTINUED | OUTPATIENT
Start: 2021-07-09 | End: 2021-07-09

## 2021-07-09 RX ADMIN — TAMSULOSIN HYDROCHLORIDE 0.4 MILLIGRAM(S): 0.4 CAPSULE ORAL at 10:57

## 2021-07-09 RX ADMIN — SODIUM CHLORIDE 120 MILLILITER(S): 9 INJECTION INTRAMUSCULAR; INTRAVENOUS; SUBCUTANEOUS at 10:35

## 2021-07-09 NOTE — DISCHARGE NOTE PROVIDER - HOSPITAL COURSE
Patient is a 76y old  Male who presents with a chief complaint of left renal stone with hydronephrosis (09 Jul 2021 05:54)      HPI:  75yo male with PMH of BPH and arthritis presenting to [facility] with suprapubic pain and left flank pain. He states the suprapubic pain began approximately two weeks ago. States he thought the pain was due to constipation. He states it was sporadic and would occur only a few days a week. Then this morning he began to have severe 9/10 suprapubic pain, more severe on the left side and then is radiated to the left flank. He presented to [facility] where they ordered a CT scan. He states a provider there called him and told him to come to ED due to stones. Also states today he had three episodes of NBNB emesis. Poor PO intake. He also admits to chills today and rigors. Denies fevers. Also admits to dysuria this morning and worsening bladder fullness for the past few days. He states he has not taken his flomax for the past few days. Denies hematuria.     patient was able to void 120cc. bedside bladder scan displayed PVR >500cc.  (08 Jul 2021 22:25)    7/9: Patient VSS, HDS, and afebrile.  He is pending TOV and will be discharged to home once he passes.  Urine cultures pending results.  Patient will be discharged w/ Bactrim and Flomax.         Vital Signs Last 24 Hrs  T(C): 36.8 (09 Jul 2021 06:08), Max: 37.1 (08 Jul 2021 16:35)  T(F): 98.2 (09 Jul 2021 06:08), Max: 98.8 (08 Jul 2021 16:35)  HR: 55 (09 Jul 2021 06:08) (54 - 64)  BP: 119/61 (09 Jul 2021 06:08) (108/59 - 203/85)  BP(mean): --  RR: 16 (09 Jul 2021 06:08) (16 - 18)  SpO2: 96% (09 Jul 2021 06:08) (96% - 96%)  I&O's Summary    LABS:                        12.4   7.11  )-----------( 139      ( 09 Jul 2021 06:03 )             37.4     07-09    141  |  107  |  19  ----------------------------<  93  4.4   |  25  |  1.02    Ca    8.3<L>      09 Jul 2021 06:03    TPro  6.3  /  Alb  4.2  /  TBili  1.0  /  DBili  x   /  AST  18  /  ALT  16  /  AlkPhos  73  07-08

## 2021-07-09 NOTE — DISCHARGE NOTE PROVIDER - NSDCCPCAREPLAN_GEN_ALL_CORE_FT
PRINCIPAL DISCHARGE DIAGNOSIS  Diagnosis: Renal colic on left side  Assessment and Plan of Treatment:       SECONDARY DISCHARGE DIAGNOSES  Diagnosis: Renal colic on left side  Assessment and Plan of Treatment: Renal colic on left side    Diagnosis: Hydronephrosis with urinary obstruction due to ureteral calculus  Assessment and Plan of Treatment:

## 2021-07-09 NOTE — HISTORY OF PRESENT ILLNESS
[FreeTextEntry1] : Mr. KATHARINE DAILY comes in today urgently for a urologic evaluation.  Approximately 2-1/2 weeks he began experiencing mild suprapubic and left flank pain which increased in severity approximately 24 hours ago ("8/10").  Initially, he went to [facility], where a CT scan was ordered (see below). He proceeded to the [facility] ER, where a Sutherland catheter was placed due to a post-void residual above 500cc; this was removed prior to his discharge earlier today.  \par \par From his prior general urologic history, Mr. Daily reports moderate chronic lower urinary tract symptoms (obstructive and irritative), with nocturia x 1-2. He is continuing on Flomax qod.  Mr. Daily has had two prior episodes of retention, one exacerbated by his OTC sinusitis medication, and the other after total knee replacement  in 2014, which was managed with a Sutherland catheter. \par IPSS: 27/35\par \par His erectile function is reportedly normal. His ejaculation has decreased, likely due to the a1-blocker.\par \par PSAs: 2/2/17--0.6; \par \par Creatinine: 7/9/21--1.02;  1/9/17--1.06;  \par \par UA:  7/8/21--Urine culture: neg\par \par CT: 6/9/21--5mm stone in the left distal ureter approximately 4cm proximal to the UVJ (802 HU). 8mm stone within the left UVJ partially protruding into the bladder lumen. Persistent moderate hydroureter. Severe stranding of periureteral fat which may be the result of obstruction or infection;

## 2021-07-09 NOTE — DISCHARGE NOTE NURSING/CASE MANAGEMENT/SOCIAL WORK - PATIENT PORTAL LINK FT
You can access the FollowMyHealth Patient Portal offered by [facility] by registering at the following website: [email or web address]. By joining [facility]’s FollowMyHealth portal, you will also be able to view your health information using other applications (apps) compatible with our system.

## 2021-07-09 NOTE — ADDENDUM
[FreeTextEntry1] : A portion of this note was written by [Benito Strickland] on 07/09/2021 acting as a scribe for Dr. Abrams. \par \par I have personally reviewed the chart and agree that the record accurately reflects my personal performance of the history, physical exam, assessment, and plan.

## 2021-07-09 NOTE — DISCHARGE NOTE PROVIDER - NSDCMRMEDTOKEN_GEN_ALL_CORE_FT
Bactrim  mg-160 mg oral tablet: 1 cap(s) orally 2 times a day MDD:2  docusate sodium 250 mg oral capsule: 1 cap(s) orally once a day  Flomax 0.4 mg oral capsule: 1 cap(s) orally once a day MDD:1  senna 15 mg oral tablet: 1 tab(s) orally 2 times a day

## 2021-07-09 NOTE — PROGRESS NOTE ADULT - PROBLEM SELECTOR PLAN 1
- admit to urology   - f/u AM labs and trend   - IVF @120cc/hours   - pain control   - nausea control   - flomax   - monitor urine output   - strain urine   - miralax for constipation

## 2021-07-09 NOTE — DISCHARGE NOTE PROVIDER - CARE PROVIDER_API CALL
Santos Abrams)  Urology  [address] [place] [address]  Phone: [phone]  Fax: [phone]  Follow Up Time:

## 2021-07-09 NOTE — DISCHARGE NOTE PROVIDER - NSDCFUADDINST_GEN_ALL_CORE_FT
If fever >100.4 or any change or worsening of symptoms please call doctor or report to ED. Make follow up appointment with Dr. Abrams.   Make sure to drink plenty of fluids.

## 2021-07-10 LAB
CULTURE RESULTS: SIGNIFICANT CHANGE UP
SPECIMEN SOURCE: SIGNIFICANT CHANGE UP

## 2021-07-16 DIAGNOSIS — R68.83 CHILLS (WITHOUT FEVER): ICD-10-CM

## 2021-07-16 DIAGNOSIS — N40.0 BENIGN PROSTATIC HYPERPLASIA WITHOUT LOWER URINARY TRACT SYMPTOMS: ICD-10-CM

## 2021-07-16 DIAGNOSIS — Z96.652 PRESENCE OF LEFT ARTIFICIAL KNEE JOINT: ICD-10-CM

## 2021-07-16 DIAGNOSIS — N13.2 HYDRONEPHROSIS WITH RENAL AND URETERAL CALCULOUS OBSTRUCTION: ICD-10-CM

## 2021-07-16 DIAGNOSIS — R68.89 OTHER GENERAL SYMPTOMS AND SIGNS: ICD-10-CM

## 2021-07-16 DIAGNOSIS — R11.10 VOMITING, UNSPECIFIED: ICD-10-CM

## 2021-07-16 DIAGNOSIS — M19.90 UNSPECIFIED OSTEOARTHRITIS, UNSPECIFIED SITE: ICD-10-CM

## 2021-07-21 LAB — NIDUS STONE QN: NORMAL

## 2021-11-30 NOTE — ASSESSMENT
[FreeTextEntry1] : I discussed the findings and options in detail with Mr. KATHARINE MONACO and his son in detail.  Although he is currently comfortable (possibly because he was just discharged from the emergency room) it is not very likely that he will pass both stones spontaneously.  Of the treatment options available, ureteroscopy is preferred as it has the highest success rate.  I described the procedure to them, including the risks (bleeding, infection, inability to access/break/remove the stone, ureteral/bowel/vascular organ injuries, retention, need for subsequent intervention) and benefits.  The need for a ureteral stent was discussed, as well as its potential for increasing irritative lower urinary symptoms and hematuria. \par \par We will schedule Mr. Monaco for this upcoming Tuesday (July 13) for the ureteroscopic procedure.  In the meantime he will strain his urine, monitor his temperature and increase the Flomax to twice daily (monitoring his blood pressure closely). none

## 2022-07-07 NOTE — ED SUB INTERN NOTE - OBJECTIVE STATEMENT FT
70 yo man presents with worsening cough x 4-5 weeks. Pt notes his temperature has intermittently been about 100-101 for the past month. The cough has worsened in past week. He was started on Augmentin by PCP for suspected sinusitis with no improvement. He notes he drank significantly when starting the abx which may have contributed to worsening with associated nausea and dizziness. Patient has had decreased urination since beginning antibiotics with increased attempts and appropriate hydration. Denies pain or burning with urination. His last BM was 4 days ago.   States the coughing is worse at night, improves with quicker breaths and sitting up. Denies chest pain, swelling, palpitations.     PMH:   Found to have silent stroke, July 2016, after PCP noted decreased hearing in right ear. Patient notes cardiac work-up was negative  Notes history of abnormal heart rhythm attributed to leaky valves but unable to elaborate on more details.    PSH:  Left knee replacement 2014
Patient requests all Lab, Cardiology, and Radiology Results on their Discharge Instructions

## 2022-10-27 NOTE — ED PROVIDER NOTE - OBJECTIVE STATEMENT
here with about a week of feeling ill.  Says he feels some mild cough, sore throat, intermittent shortness of breath, dizziness.  Denies fever/chills.  Says he has been talking to his doctor and was told to come get tested for covid19.  Denies travel or known exposure.  Also with mild headache.  Hasn't taken anything for symptoms. needed assist

## 2022-11-11 NOTE — PROGRESS NOTE ADULT - SUBJECTIVE AND OBJECTIVE BOX
INTERVAL HPI/OVERNIGHT EVENTS:  No acute events overnight.    VITALS:    T(F): 97 (21 @ 02:26), Max: 98.8 (21 @ 16:35)  HR: 60 (21 @ 02:26) (54 - 64)  BP: 138/68 (21 @ 02:26) (108/59 - 203/85)  RR: 16 (21 @ 02:26) (16 - 18)  SpO2: 96% (21 @ 02:26) (96% - 96%)  Wt(kg): --    I&O's Detail      MEDICATIONS:    ANTIBIOTICS:      PAIN CONTROL:  acetaminophen   Tablet .. 650 milliGRAM(s) Oral every 6 hours PRN  ondansetron Injectable 4 milliGRAM(s) IV Push every 8 hours PRN       MEDS:      HEME/ONC        PHYSICAL EXAM:  General: No acute distress.  Alert and Oriented  Abdominal Exam: soft, non-tender, non-distended    Exam: reed in place draining clear yellow urine       LABS:                        14.0   10.39 )-----------( 150      ( 2021 17:55 )             41.5     07-    138  |  103  |  24<H>  ----------------------------<  112<H>  4.1   |  25  |  1.42<H>    Ca    9.3      2021 17:55    TPro  6.3  /  Alb  4.2  /  TBili  1.0  /  DBili  x   /  AST  18  /  ALT  16  /  AlkPhos  73  07-08      Urinalysis Basic - ( 2021 17:55 )    Color: Yellow / Appearance: Clear / S.025 / pH: x  Gluc: x / Ketone: 15 mg/dL  / Bili: Negative / Urobili: 1.0 E.U./dL   Blood: x / Protein: NEGATIVE mg/dL / Nitrite: NEGATIVE   Leuk Esterase: NEGATIVE / RBC: 5-10 /HPF / WBC < 5 /HPF   Sq Epi: x / Non Sq Epi: 0-5 /HPF / Bacteria: Present /HPF           no

## 2023-02-11 NOTE — ED PROVIDER NOTE - PHYSICAL EXAMINATION
Airway patent, Nasal mucosa clear. Mouth with normal mucosa. Throat has no vesicles, no oropharyngeal exudates and uvula is midline. CONSTITUTIONAL: Well appearing, well nourished, awake, alert and in no apparent distress.  ENMT: Airway patent.  HEAD: Head is atraumatic. Head shape is symmetrical.  EYES: Clear bilaterally. Normal EOMI.  CARDIAC: Normal rate, regular rhythm.  Heart sounds S1, S2.   RESPIRATORY: Breath sounds clear and equal bilaterally.  GASTROINTESTINAL: Abdomen soft, non-tender, no guarding.  MUSCULOSKELETAL: Spine appears normal, range of motion is not limited, no muscle or joint tenderness.   NEUROLOGICAL: Alert and oriented, no focal deficits, no motor or sensory deficits.  SKIN: Skin normal color for race, warm, dry and intact. No evidence of rash.  PSYCHIATRIC: Alert and oriented to person, place, time/situation. normal mood and affect. no apparent risk to self or others. Airway patent, Nasal mucosa clear. Mouth with normal mucosa. Throat has no vesicles, no oropharyngeal exudates and uvula is midline.  No stridor

## 2023-05-09 NOTE — ED PROVIDER NOTE - CPE EDP GASTRO NORM
Called pt, LORETOM for pt to call our office for condition update
Pt returning my call, having co right knee pain denies swelling or stiffness. Using ACE wrap, seems to help. Worse while sitting, and laying down in bed. Pt has co fine rash to face, neck and chest. Denies any new medication, but has been out in the sun a few days. Explained she should contact derm to have rash evaluated, and voiced understanding,.      Pt would like to be added to Dr. Pauly Chappell cancellation list
normal...

## 2024-05-01 NOTE — DISCHARGE NOTE PROVIDER - NSDCCPGOAL_GEN_ALL_CORE_FT
Problem: PHYSICAL THERAPY ADULT  Goal: Performs mobility at highest level of function for planned discharge setting.  See evaluation for individualized goals.  Description: Treatment/Interventions: Functional transfer training, LE strengthening/ROM, Therapeutic exercise, Elevations, Endurance training, Bed mobility, Gait training, Spoke to nursing, Spoke to case management, OT  Equipment Recommended: Walker (pt owns)       See flowsheet documentation for full assessment, interventions and recommendations.  Outcome: Progressing  Note: Prognosis: Good  Problem List: Decreased strength, Decreased range of motion, Decreased endurance, Impaired balance, Decreased mobility, Orthopedic restrictions, Pain  Assessment: Patient was seen today per POC. Pt was greeted in recliner chair at start of session. Pt was able to perform sit<>stand with RW and S. Pt have better understanding of post. Hip precautions today, able to tell them to PT without assistance. Pt ambulated 120'x1 with S and RW. Pt able to perform 10 steps with BL rails and S. Pt needed cues for proper LE sequencing on stairs, but good carryover. BP in standing prior to activity, 120/71, (-) symptoms. Overall, pt demonstrated improved mobility and inc tolerance to activity. Pt was left in recliner chair at end of session, RN updated. Will continue to see pt per POC as tolerated.  From PT standpoint continued recommendation for home with HHPT at D/C when medically cleared based on nursing. Nsg staff to continue to mobilized pt (OOB in chair for all meals & ambulate in room/unit) as tolerated to prevent further decline in function. Nsg staff notified.  Based on pt presentation and impaired function, pt would benefit from level III, (minimal resource intensity) at D/C.  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         To get better and follow your care plan as instructed. Ambulation, Hydration, and return to activity of daily living.

## 2024-10-09 NOTE — ED ADULT NURSE NOTE - CAS DISCH CONDITION
I reviewed the H&P, I discussed with the patient, and there are no changes in the patient's condition.     Improved